# Patient Record
Sex: MALE | URBAN - METROPOLITAN AREA
[De-identification: names, ages, dates, MRNs, and addresses within clinical notes are randomized per-mention and may not be internally consistent; named-entity substitution may affect disease eponyms.]

---

## 2024-10-22 ENCOUNTER — APPOINTMENT (RX ONLY)
Dept: URBAN - METROPOLITAN AREA CLINIC 101 | Facility: CLINIC | Age: 49
Setting detail: DERMATOLOGY
End: 2024-10-22

## 2024-10-22 VITALS — TEMPERATURE: 97 F | DIASTOLIC BLOOD PRESSURE: 85 MMHG | SYSTOLIC BLOOD PRESSURE: 135 MMHG

## 2024-10-22 DIAGNOSIS — L72.0 EPIDERMAL CYST: ICD-10-CM

## 2024-10-22 DIAGNOSIS — D485 NEOPLASM OF UNCERTAIN BEHAVIOR OF SKIN: ICD-10-CM

## 2024-10-22 DIAGNOSIS — L57.8 OTHER SKIN CHANGES DUE TO CHRONIC EXPOSURE TO NONIONIZING RADIATION: ICD-10-CM

## 2024-10-22 PROBLEM — D48.5 NEOPLASM OF UNCERTAIN BEHAVIOR OF SKIN: Status: ACTIVE | Noted: 2024-10-22

## 2024-10-22 PROCEDURE — 99203 OFFICE O/P NEW LOW 30 MIN: CPT | Mod: 25

## 2024-10-22 PROCEDURE — 11423 EXC H-F-NK-SP B9+MARG 2.1-3: CPT

## 2024-10-22 PROCEDURE — ? SUNSCREEN RECOMMENDATIONS

## 2024-10-22 PROCEDURE — ? COUNSELING

## 2024-10-22 PROCEDURE — 13132 CMPLX RPR F/C/C/M/N/AX/G/H/F: CPT

## 2024-10-22 PROCEDURE — ? CONSULTATION EXCISION

## 2024-10-22 PROCEDURE — ? PRESCRIPTION

## 2024-10-22 PROCEDURE — ? EXCISION

## 2024-10-22 RX ORDER — CEPHALEXIN 500 MG/1
CAPSULE ORAL BID
Qty: 10 | Refills: 0 | Status: ERX | COMMUNITY
Start: 2024-10-22

## 2024-10-22 RX ORDER — MUPIROCIN 20 MG/G
OINTMENT TOPICAL BID
Qty: 22 | Refills: 2 | Status: ERX | COMMUNITY
Start: 2024-10-22

## 2024-10-22 RX ADMIN — MUPIROCIN: 20 OINTMENT TOPICAL at 00:00

## 2024-10-22 RX ADMIN — CEPHALEXIN: 500 CAPSULE ORAL at 00:00

## 2024-10-22 ASSESSMENT — LOCATION SIMPLE DESCRIPTION DERM: LOCATION SIMPLE: POSTERIOR NECK

## 2024-10-22 ASSESSMENT — LOCATION DETAILED DESCRIPTION DERM
LOCATION DETAILED: LEFT INFERIOR POSTERIOR NECK
LOCATION DETAILED: MID POSTERIOR NECK

## 2024-10-22 ASSESSMENT — LOCATION ZONE DERM: LOCATION ZONE: NECK

## 2024-10-22 NOTE — HPI: SURGICAL CONSULTATION
Has The Lesion Been Biopsied Before?: has not been previously biopsied
What Is The Location Of The Lesion?: Neck
Who Is Your Referring Physician?: Dr. Morrell

## 2024-10-22 NOTE — PROCEDURE: CONSULTATION EXCISION
Detail Level: Detailed
Size Of Lesion: 1.7
X Size Of Lesion In Cm (Optional): 0
Anatomic Location From Referring Provider: neck

## 2024-10-29 ENCOUNTER — APPOINTMENT (RX ONLY)
Dept: URBAN - METROPOLITAN AREA CLINIC 101 | Facility: CLINIC | Age: 49
Setting detail: DERMATOLOGY
End: 2024-10-29

## 2024-10-29 DIAGNOSIS — Z48.02 ENCOUNTER FOR REMOVAL OF SUTURES: ICD-10-CM

## 2024-10-29 PROCEDURE — 99024 POSTOP FOLLOW-UP VISIT: CPT

## 2024-10-29 PROCEDURE — ? SUTURE REMOVAL (GLOBAL PERIOD)

## 2024-10-29 ASSESSMENT — LOCATION DETAILED DESCRIPTION DERM: LOCATION DETAILED: MID POSTERIOR NECK

## 2024-10-29 ASSESSMENT — LOCATION SIMPLE DESCRIPTION DERM: LOCATION SIMPLE: POSTERIOR NECK

## 2024-10-29 ASSESSMENT — LOCATION ZONE DERM: LOCATION ZONE: NECK

## 2024-10-29 NOTE — PROCEDURE: SUTURE REMOVAL (GLOBAL PERIOD)
Detail Level: Detailed
Add 59686 Cpt? (Important Note: In 2017 The Use Of 16814 Is Being Tracked By Cms To Determine Future Global Period Reimbursement For Global Periods): yes